# Patient Record
Sex: FEMALE | Employment: STUDENT | URBAN - METROPOLITAN AREA
[De-identification: names, ages, dates, MRNs, and addresses within clinical notes are randomized per-mention and may not be internally consistent; named-entity substitution may affect disease eponyms.]

---
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
## 2025-01-13 NOTE — PROGRESS NOTES
Naomi Gomez is a 19 year old female who presents for a complete physical exam.   HPI:     Chief Complaint   Patient presents with    Physical     Reviewed Preventative/Wellness form with patient.     Contraception     Pt would like to discuss going back on birth control          Age: 19    1First day of last menstrual period (or first year of         menstruation, if through menopause 1 week ago   2Number of times pregnant: 1              Number of completed pregnancies:  0              Date of last pregnancy:  1 elective AB   3. If you are under age 55, what method of birth control do you use?               Are you planning a pregnancy  in the next 6-12 months? NO wishes  to go on IUD               If you are through menopause or over age 50, do you take  any of the following pills?                         Calcium NO                        Estrogen (Premarin)  NO                        Progesterone (Provera) NO   4. Have you had any of the following problems:              A.  Abnormal Pap smears  NA               If yes, date:                problem:                For abnormality, did you have any of the following done:                   Colposcopy    NO                  Biopsies    NO                  Surgery    NO            B. High blood pressure, heart disease or high cholesterol NO                D.Abdominal or pelvic surgery or special tests  NO   5. Do you have any of the following:     Problems with present method of birth control NA   Bleeding between periods or since periods stopped  NO    A new or enlarging lump in breast NO      Change in size/firmness of stools    NO   Change in size/color of a mole NO   6. Do you have a parent, brother or sister with a history of the following:                  Cancer of the breast, intestine or female organs Breast Ca in 2  great aunts dx in 40's neg gen testing per pt                 Heart pain or heart attacks  before the age of 55 NO   8. Have you ever used  tobacco?    NO     9. Do you drink alcohol?  NO   10. Prevention:        b. Exercise:                Activity:         c. Do you always wear seat belts?     NO       d. If over 30 years old, have you  had your cholesterol level checked  in the past five years? NO       e. Have you had a tetanus shot  the past 10 years? YES 2016       f. Does your house have a working smoke detector? YES        g. Do you have firearms at home?   NO        h. Have you ever had a mammogram?  YES NO       i.  How many sexual partners have you  had in the last 12 months? 1            In your lifetime?      j. When is the last time you had           a dental check-up?  2 years ago       11. Please describe any concerns you have:          Requests  IUD for contraception        Wt Readings from Last 3 Encounters:   01/13/25 152 lb (68.9 kg) (81%, Z= 0.90)*   11/01/24 151 lb (68.5 kg) (81%, Z= 0.88)*   10/15/24 151 lb (68.5 kg) (81%, Z= 0.88)*     * Growth percentiles are based on CDC (Girls, 2-20 Years) data.        BP Readings from Last 3 Encounters:   01/13/25 118/68   10/15/24 112/64   07/12/24 143/83         Patient's last menstrual period was 01/06/2025 (approximate).       No current outpatient medications on file.      Past Medical History:    Anxiety and depression    Back pain    Bloating    Body piercing    Constipation    Diarrhea, unspecified    Dizziness    Flatulence/gas pain/belching    Food intolerance    Heartburn    History of depression    History of eating disorder    History of mental disorder    Irregular bowel habits    Stress      No past surgical history on file.   Family History   Problem Relation Age of Onset    Diabetes Maternal Grandfather     Diabetes Paternal Grandmother     Diabetes Paternal Grandfather       Social History     Socioeconomic History    Marital status: Single   Tobacco Use    Smoking status: Never     Passive exposure: Never    Smokeless tobacco: Never   Vaping Use    Vaping status: Never Used    Substance and Sexual Activity    Alcohol use: Not Currently    Drug use: Not Currently    Sexual activity: Yes     Partners: Male     Birth control/protection: Condom   Social History Narrative    ** Merged History Encounter **             No current outpatient medications on file.      Past Medical History:    Anxiety and depression    Back pain    Bloating    Body piercing    Constipation    Diarrhea, unspecified    Dizziness    Flatulence/gas pain/belching    Food intolerance    Heartburn    History of depression    History of eating disorder    History of mental disorder    Irregular bowel habits    Stress      No past surgical history on file.   Family History   Problem Relation Age of Onset    Diabetes Maternal Grandfather     Diabetes Paternal Grandmother     Diabetes Paternal Grandfather       Social History:   Social History     Socioeconomic History    Marital status: Single   Tobacco Use    Smoking status: Never     Passive exposure: Never    Smokeless tobacco: Never   Vaping Use    Vaping status: Never Used   Substance and Sexual Activity    Alcohol use: Not Currently    Drug use: Not Currently    Sexual activity: Yes     Partners: Male     Birth control/protection: Condom   Social History Narrative    ** Merged History Encounter **                 REVIEW OF SYSTEMS:   GENERAL HEALTH: feels well, no fatigue.  SKIN: denies any unusual skin lesions or rashes  EYES: no visual complaints or deficits  HEENT: denies nasal congestion, sinus pain or sore throat; hearing loss negative,   RESPIRATORY: denies shortness of breath, wheezing or cough   CARDIOVASCULAR: denies chest pain, SOB, edema,orthopnea, no palpitations   GI: denies nausea, vomiting, constipation, diarrhea; no rectal bleeding; no heartburn  GENITAL/: no dysuria, urgency or frequency  MUSCULOSKELETAL: no joint complaints upper or lower extremities  NEURO: no sensory or motor complaint  HEMATOLOGY: denies hx anemia; denies bruising or excessive  bleeding  ENDOCRINE: denies excessive thirst or urination; denies unexpected wt gain or wt loss  ALLERGY/IMM.: denies food or seasonal allergies  PSYCH: no symptoms of depression or anxiety, depression screening negative.      EXAM:   /68   Pulse 96   Ht 5' 2\" (1.575 m)   Wt 152 lb (68.9 kg)   LMP 01/06/2025 (Approximate)   SpO2 100%   BMI 27.80 kg/m²      General: WD/WN in no acute distress.   HEENT: PERRLA and EOMI.  OP moist no lesions.TM WNL, bozena.Normal ears canals bilaterally.  Neck is supple, with no cervical LAD or thyroid abnormalities. No carotid bruits.    Lungs: are clear to auscultation bilaterally, with no wheeze, rhonchi, or rales.   Heart: is RRR.  S1, S2, with no murmurs,clicks, gallops  Abdomen: is soft,NBS, NT/ND with no HSM.  No rebound or guarding. No CVA tenderness, no hernias.   exam: deferred    Neuro: Cranial nerves II-XII normal,no focal abnormalities, and reflexes coordination and gait normal and symmetric.Sensation intact.  Extremities: are symmetric with no cyanosis, clubbing, or edema.  MS: Normal muscles tones, no joints abnormalities.  SKIN: Normal color, turgor, no lesions, rashes or wounds.  PSYCH: normal affect and mood.      ASSESSMENT AND PLAN:       1. Encounter for annual physical examination excluding gynecological examination in a patient older than 17 years    2. Counseling for birth control regarding intrauterine device (IUD)  - [facility] - INTERNAL          Naomi Gomez is a 19 year old female who presents for a complete physical exam.Gyn exam and Pap smear not due  Self breast exams advised.  The patient should schedule annual mammograms beginning at the age of 40.    Counseled on fat diet and aerobic exercise 30 minutes three times weekly.   Counseled on maintaining a healthy weight and healthy BMI  Health maintenance.   Immunizations reviewed and updated  TDAP UTD  The patient indicates understanding of these issues and agrees to the plan.  The patient is  asked  to return yearly for annual preventative health exam.    Well balanced diet recommended.    Routine exercise recommended most days during the week.  Wear sunscreen - SPF 15 or higher and reapply every 2 hours as needed.  Wear seat belts and drive safely.  Schedule regular appointments with dentist.  Schedule yearly eye exam if you wear glasses/contacts.  Yearly Flu Vaccine recommended.  Tetanus, Diptheria and Pertussis vaccine should be given every 7-10 years.  Call or come in if there are concerns regarding domestic abuse, sexually transmitted diseases, alcohol/drug addiction, depression/anxiety issues, or any further concerns.    PATIENT INSTRUCTIONS:    Follow up with OB for possible IUD   Follow up yearly or as needed      FOLLOW UP:  Yearly or as needed

## 2025-01-13 NOTE — PATIENT INSTRUCTIONS
Thank you for choosing [facility]  To Do:  FOR TRENA CAR    Follow up with OB for possible IUD   Follow up yearly or as needed          Well balanced diet recommended.    Routine exercise recommended most days during the week.  Wear sunscreen - SPF 15 or higher and reapply every 2 hours as needed.  Wear seat belts and drive safely.  Schedule regular appointments with dentist.  Schedule yearly eye exam if you wear glasses/contacts.  Yearly Flu Vaccine recommended.  Tetanus, Diptheria and Pertussis vaccine should be given every 7-10 years.  Call or come in if there are concerns regarding domestic abuse, sexually transmitted diseases, alcohol/drug addiction, depression/anxiety issues, or any further concerns.          Prevention Guidelines, Women Ages 18 to 39  Screening tests and vaccines are an important part of managing your health. Health counseling is essential, too. Below are guidelines for these, for women ages 18 to 39. Talk with your healthcare provider to make sure you’re up-to-date on what you need.  Screening Who needs it How often   Alcohol misuse All women in this age group At routine exams   Blood pressure All women in this age group Every 2 years if your blood pressure is less than 120/80 mm Hg; yearly if your systolic blood pressure is 120 to 139 mm Hg, or your diastolic blood pressure reading is 80 to 89 mm Hg   Breast cancer All women in this age group should talk with their healthcare providers about the need for clinical breast exams (CBE)1 Clinical breast exam every 3 years1   Cervical cancer Women ages 21 and older Women between ages 21 and 29 should have a Pap test every 3 years; women between ages 30 and 65 are advised to have a Pap test plus an HPV test every 5 years   Chlamydia Sexually active women ages 24 and younger, and women at increased risk for infection Every 3 years if you're at risk or have symptoms   Depression All women in this age group At routine exams   Diabetes  mellitus, type 2 Adults with no symptoms who are overweight or obese and have 1 or more other risk factors for diabetes At least every 3 years   Gonorrhea Sexually active women at increased risk for infection At routine exams   Hepatitis C Anyone at increased risk At routine exams   HIV All women At routine exams   Obesity All women in this age group At routine exams   Syphilis Women at increased risk for infection - talk with your healthcare provider At routine exams   Tuberculosis Women at increased risk for infection - talk with your healthcare provider Ask your healthcare provider   Vision All women in this age group At least 1 complete exam in your 20s, and 2 in your 30s   Vaccine2 Who needs it How often   Chickenpox (varicella) All women in this age group who have no record of this infection or vaccine 2 doses; the second dose should be given 4 to 8 weeks after the first dose   Hepatitis A Women at increased risk for infection - talk with your healthcare provider 2 doses given at least 6 months apart   Hepatitis B Women at increased risk for infection - talk with your healthcare provider 3 doses over 6 months; second dose should be given 1 month after the first dose; the third dose should be given at least 2 months after the second dose and at least 4 months after the first dose   Haemophilus influenzae Type B (HIB) Women at increased risk for infection - talk with your healthcare provider 1 to 3 doses   Human papillomavirus (HPV) All women in this age group up to age 26 3 doses; the second dose should be given 1 to 2 months after the first dose and the third dose given 6 months after the first dose   Influenza (flu) All women in this age group Once a year   Measles, mumps, rubella (MMR) All women in this age group who have no record of these infections or vaccines 1 or 2 doses   Meningococcal Women at increased risk for infection - talk with your healthcare provider 1 or more doses   Pneumococcal conjugate  vaccine (PCV13) and pneumococcal polysaccharide vaccine (PPSV23) Women at increased risk for infection - talk with your healthcare provider PCV13: 1 dose ages 19 to 65 (protects against 13 types of pneumococcal bacteria)  PPSV23: 1 to 2 doses through age 64, or 1 dose at 65 or older (protects against 23 types of pneumococcal bacteria)      Tetanus/diphtheria/pertussis (Td/Tdap) booster All women in this age group Td every 10 years, or a one-time dose of Tdap instead of a Td booster after age 18, then Td every 10 years   Counseling Who needs it How often   BRCA gene mutation testing for breast and ovarian cancer susceptibility Women with increased risk for having gene mutation When your risk is known   Breast cancer and chemoprevention Women at high risk for breast cancer When your risk is known   Diet and exercise Women who are overweight or obese When diagnosed, and then at routine exams   Domestic violence Women at the age in which they are able to have children At routine exams   Sexually transmitted infection prevention Women who are sexually active At routine exams   Skin cancer Prevention of skin cancer in fair-skinned adults through age 24 At routine exams   Use of tobacco and the health effects it can cause All women in this age group Every visit   1According to the ACS, women ages 20 to 39 years should have a clinical breast exam (CBE) as part of their routine health exam every 3 years, and breast self-exams are an option for women starting in their 20s. However, the  USPSTF does not recommend CBE.  2Those who are 18 years of age, who are not up-to-date on their childhood immunizations, should receive all appropriate catch-up vaccines recommended by the CDC.  Date Last Reviewed: 8/27/2015  © 7878-7943 [employer]. [address] [place] [address], [place] [address]. All rights reserved. This information is not intended as a substitute for professional medical care. Always follow your healthcare  professional's instructions.

## 2025-02-04 NOTE — PROGRESS NOTES
[facility]  Obstetrics and Gynecology  Follow Up Progress Note    Subjective:     Naomi Gomez is a 20 year old  female who was last seen in office 10/15/2024 for dysmenorrhea and presents with today to discuss birth control. The patient reports she has used birth control in the past but discontinued all due to unwanted side effects.  She has tried OCPs, Depo shot, and patch.  She currently uses condoms but she is nervous of unwanted pregnancy at this time.  Patient would like LARC for birth control.  Prefers non-hormonal management at this time.  Interested in ParaGard IUD. Pt states she's had moderate to severe pelvic pain with every menses, some worse than others. Periods are regular, lasting 5-6 days, and she uses 3-4 pads with heaviest flow, intermittent clots.     Review of Systems:  General: no complaints per category. See HPI for additional information.   Breast: no complaints per category. See HPI for additional information.   Respiratory: no complaints per category. See HPI for additional information.   Cardiovascular: no complaints per category. See HPI for additional information.   GI: no complaints per category. See HPI for additional information.   : no complaints per category. See HPI for additional information.   Heme: no complaints per category. See HPI for additional information.     OB History    Para Term  AB Living   1 0 0 0 1 0   SAB IAB Ectopic Multiple Live Births   0 1 0 0 0      # Outcome Date GA Lbr Beni/2nd Weight Sex Type Anes PTL Lv   1 IAB                  Gyne History:  Menarche: 11  Period Cycle (Days): 28-30 days  Period Duration (Days): 7 days  Period Flow: Moderate  Hx Prior Abnormal Pap: No  Pap Result Notes: Pt has never had a pap  Patient's last menstrual period was 2025 (approximate).      Meds:  Medications Ordered Prior to Encounter[1]    All:  Allergies[2]    PMH:  Past Medical History:    Anxiety and depression    Back pain     Bloating    Body piercing    Constipation    Diarrhea, unspecified    Dizziness    Flatulence/gas pain/belching    Food intolerance    Heartburn    History of depression    History of eating disorder    History of mental disorder    Irregular bowel habits    Stress       PSH:  History reviewed. No pertinent surgical history.      Objective:     Vitals:    25 0844   BP: 114/70   Pulse: 113   Weight: 154 lb 6.4 oz (70 kg)         Body mass index is 28.24 kg/m².    General: AAO.NAD.   CVS exam: normal peripheral perfusion  Chest: non-labored breathing, no tachypnea   Breast: Deferred  Abdominal exam: soft, nontender, nondistended  Pelvic exam: Deferred  Ext: non-tender, no edema    Labs:    [id number] TVUS  PROCEDURE: US PELVIS W EV (CPT=76856/09497)     COMPARISON: None.     INDICATIONS: Pelvic pain.     TECHNIQUE: Pelvic ultrasound using transabdominal and transvaginal technique.  A transvaginal scan was performed for better assessment of the uterus and adnexal structures.     FINDINGS:  UTERUS:   Retroverted and anteflexed.  Size is 8.1 x 2.4 x 4.3 cm.       ENDOMETRIUM: Endometrial thickness is 7 mm.  No fluid or mass in the endometrial canal.    MYOMETRIUM: Normal echogenicity.  No masses.  The cervix is unremarkable.     OVARIES AND ADNEXA:     RIGHT:   The right ovary measures 3.9 x 1.6 x 3.2 cm.  There is a 1.2 cm anechoic cyst abutting the margin of the right ovary.  LEFT:   The left ovary measures 3.1 x 1.7 x 2.6 cm.  Normal appearance with no masses.       CUL-DE-SAC:   Normal.  No free fluid or mass.    OTHER: Negative.  Bladder appears normal.     Impression   CONCLUSION:  1. Unremarkable sonographic appearance of the uterus and left ovary.  2. Simple 1.2 cm cyst abutting the margin of the right ovary.  This finding could relate to an exophytic physiologic right ovarian follicle or a simple paratubal cyst.        Assessment:     Naomi Gomez is a 20 year old  female who presents for  contraception counseling        Plan:     Problem List Items Addressed This Visit       Dysmenorrhea     Other Visit Diagnoses       Encounter for other general counseling or advice on contraception    -  Primary              Contraception counseling  -Patient reports dysmenorrhea and desire for infertility via LARC  - d/w patient options for contraception including OCP, Nuvaring, Depo Provera and LARC (Nexplanon versus IUD)   - risks, benefits and alternatives discussed   - pt expressed interest in ParaGard IUD  - information provided including handouts  - pt advised to contact office on contraception choice and to schedule ParaGard IUD insertion procedure  -Rx for Cytotec sent to patient's pharmacy for cervical ripening prior to IUD insertion procedure      All of the findings and plan were discussed with the patient.  She notes understanding and agrees with the plan of care.  All questions were answered to the best of my ability at this time.      Total patient time was 15 minutes in evaluation, consultation, and coordination of care. This included face to face and non-face to face actions. The patient's questions and concerns were addressed.       RTC for ParaGard IUD insertion procedure or sooner if needed     Ivy Blake DO   [facility] - OBGYN       Discussed with patient that there will not be further notification of normal or benign results other than receiving results on MySQUARt. A MySQUARt message or telephone call will be placed by the physician and/or office staff if results are abnormal.     Note to patient and family   The 21st Century Cures Act makes medical notes available to patients in the interest of transparency.  However, please be advised that this is a medical document.  It is intended as rtrp-zw-ulyt communication.  It is written and medical language may contain abbreviations or verbiage that are technical and unfamiliar.  It may appear blunt or direct.  Medical documents are intended to  carry relevant information, facts as evident, and the clinical opinion of the practitioner.        This note could include assistance by Dragon voice recognition. Errors in content may be related to improper recognition by the system; efforts to review and correct have been done but errors may still exist.        [1]   No current outpatient medications on file prior to visit.     No current facility-administered medications on file prior to visit.   [2] No Known Allergies

## 2025-02-10 NOTE — TELEPHONE ENCOUNTER
Patient has appt 2/11 for IUE insert and states the medication that was to be ordered was not sent to pharmacy yet. Rx for Cytotec - pls send to pharmacy today for procedure tomorrow.

## 2025-02-10 NOTE — TELEPHONE ENCOUNTER
Ivy Blake, DO to [facility] Nurse  Me       2/10/25  1:13 PM   Rx sent to pharmacy today. Thanks!

## 2025-02-11 NOTE — PROCEDURES
Procedure: Intrauterine device insertion - Paragard    Date of Procedure: 25    Pre-procedure diagnosis:  Encounter for contraception     Post-procedure diagnosis:   Encounter for contraction     Indications:   20 year old female  who presents for Intrauterine device insertion with Paragard for contraception.     Procedure details:  The patient was counseled on various methods of contraception. The patient requested long acting reversible contraception with Paragard Intrauterine Device. The procedure, risks, benefits and alternatives were discussed with the patient. The patient was informed of risks including but not limited to the risk of bleeding, infection, injury, improper placement, pregnancy and irregular bleeding. All questions and concerns were addressed. The patient provided verbal and written consent.     The patient was placed in supine position with legs position in stirrups. A sterile speculum was placed in the vagina and the cervix was visualized. The cervix was cleaned and prepped with betadine. Lidocaine gel followed by a tenaculum clamp was placed on the anterior lip of the cervix. The uterus was sounded to 8 cm. The Paragard Intrauterine Device was then prepped and loaded in sterile fashion. The Paragard Intrauterine Device was then inserted through the cervical canal into the uterine cavity, deployed and the remaining device removed. The strings were visualized at the external os and cut to 2 cm. The tenaculum was removed. Bleeding noted at the tenaculum site. Silver nitrate was applied. Good hemostasis was then noted. The Paragard IUD strings were then tucked behind the cervix and the all instruments were removed from the vagina. The patient tolerated the procedure well. She was counseled on the recommendation for back up method of contraception for 7 days after placement. The patient verbalized understanding.     Disposition: Stable    Complications: None    Follow up: 4 weeks      Ivy Blake DO  [facility]    Discussed with patient that there will not be further notification of normal or benign results other than receiving results on mychart. A mychart message or telephone call will be placed by the physician and/or office staff if results are abnormal.     Note to patient and family   The 21st Century Cures Act makes medical notes available to patients in the interest of transparency.  However, please be advised that this is a medical document.  It is intended as tkva-um-fhln communication.  It is written and medical language may contain abbreviations or verbiage that are technical and unfamiliar.  It may appear blunt or direct.  Medical documents are intended to carry relevant information, facts as evident, and the clinical opinion of the practitioner.

## 2025-03-11 NOTE — PROGRESS NOTES
Subjective:  Naomi Gomez presents for follow up to Paragard IUD insertion.    She denies any fever or vaginal discharge.  She has had minimal bleeding since insertion.  Pt has had intercourse since insertion notes partner feels pinching   Pt also with intermittent stabbing pain. Not associated with movement or intercourse    Objective:  Physical Exam:   /68   Pulse 99   Wt 152 lb 6.4 oz (69.1 kg)   LMP 02/15/2025 (Exact Date)   BMI 27.87 kg/m²    General: Comfortable, no apparent distress  Abdomen: Soft, non-tender, non-distended, no masses  Pelvic: External genitalia normal, no cervical or vaginal lesions, + thick white discharge  Cervix: IUD string visible   Bimanual examination:  Uterus non-tender, no cervical motion tenderness, adnexa non-tender, no masses    Assessment/Plan:  Normal IUD follow up examination  Follow up as needed or for routine annual gynecologic examination    Diagnoses and all orders for this visit:    Pain due to intrauterine contraceptive device (IUD), initial encounter  - IUD strings visualized      - US PELVIS W EV (CPT=76856/40032); Future    Vaginal discharge  -     Vaginitis Vaginosis PCR Panel; Future  - will treat based on culture results    Return for annual well woman exam.

## (undated) NOTE — LETTER
Date: 8/25/2021    Patient Name: Nga Abraham          To Whom it may concern: This letter has been written at the patient's request. The above patient was seen at the [facility] for treatment of a medical condition.     This patient yaw

## (undated) NOTE — LETTER
Naomi GomezDOB:2005    CONSENT FOR PROCEDURE/SEDATION    1. I authorize the performance upon Naomi Gomez  the following: Insertion Intrauterine Device Paragard    2. I authorize Dr. Ivy Blake,  (and whomever is designated as the doctor’s assistant), to perform the above-mentioned procedures.    3. If any unforeseen conditions arise during this procedure calling for additional  procedures, operations, or medications (including anesthesia and blood transfusion), I further request and authorize the doctor to do whatever he/she deems advisable in my interest.    4. I consent to the taking and reproduction of any photographs in the course of this procedure for professional purposes.    5. I consent to the administration of such sedation as may be considered necessary or advisable by the physician responsible for this service, with the exception of ______________________________________________________    6. I have been informed by my doctor of the nature and purpose of this procedure sedation, possible alternative methods of treatment, risk involved and possible complications.    7. If I have a Do Not Resuscitate (DNR) order in place, the physician and I (or the individual authorized to consent on my behalf) will discuss and agree as to whether the Do Not Resuscitate (DNR) order will remain in effect or will be discontinued during the performance of the procedure and the applicable recovery period. If the Do Not Resuscitate (DNR) order is discontinued and is to be reinstated following the procedure/recovery period, the physician will determine when the applicable recovery period ends for purposes of reinstating the Do Not Resuscitate (DNR) order.    Signature of Patient:_______________________________________________    Signature of person authorized to consent for patient:  _______________________________________________________________    Relationship to patient:  ____________________________________________    Witness: _________________________________________ Date:___________     Physician Signature: _______________________________ Date:___________